# Patient Record
Sex: MALE | Race: WHITE | NOT HISPANIC OR LATINO | Employment: OTHER | URBAN - METROPOLITAN AREA
[De-identification: names, ages, dates, MRNs, and addresses within clinical notes are randomized per-mention and may not be internally consistent; named-entity substitution may affect disease eponyms.]

---

## 2017-09-08 ENCOUNTER — TRANSCRIBE ORDERS (OUTPATIENT)
Dept: SLEEP CENTER | Facility: CLINIC | Age: 57
End: 2017-09-08

## 2017-09-08 DIAGNOSIS — G47.33 OSA (OBSTRUCTIVE SLEEP APNEA): Primary | ICD-10-CM

## 2017-10-16 ENCOUNTER — HOSPITAL ENCOUNTER (OUTPATIENT)
Dept: SLEEP CENTER | Facility: CLINIC | Age: 57
Discharge: HOME | End: 2017-10-16
Payer: COMMERCIAL

## 2017-10-16 ENCOUNTER — TRANSCRIBE ORDERS (OUTPATIENT)
Dept: SLEEP CENTER | Facility: CLINIC | Age: 57
End: 2017-10-16

## 2017-10-16 DIAGNOSIS — G47.33 OSA (OBSTRUCTIVE SLEEP APNEA): Primary | ICD-10-CM

## 2017-10-16 DIAGNOSIS — G47.33 OSA (OBSTRUCTIVE SLEEP APNEA): ICD-10-CM

## 2017-10-16 NOTE — PROGRESS NOTES
Consultation - [address]  60 y o  male  :1960  SHELLEY:[id number]    Physician Requesting Consult: Self, Referral     Reason for Consult : I saw this patient for initial sleep evaluation today  He was diagnosed with obstructive sleep apnea in  but remains untreated  He returns because of escalation of his symptoms  Results of prior studies were not available at the time of this encounter  However he states he has apnea was in the mild to moderate category  He was prescribed CPAP but discontinued use because of mask intolerance and felt claustrophobic  Medications, Past, Family and Social Histories were reviewed  Comorbidities are notable for:  Depression with a history of a previous suicidal attempt  He failed multiple medications  He was started on mirtazapine in July of this year but symptoms persist   He also has mild COPD  Herewith findings in summary  Full details are available from our records  HPI:  He sleeps alone but states others have complained about his snoring that has been ongoing for many years  At times he awakens himself with a startle but is not aware of breathing difficulties during sleep  He has restless leg symptoms but only occasionally though can delay sleep  He also reports frequent nightmares but no other features of parasomnia  Sleep Routine:  He has a somewhat irregular sleep-wake routine  He spends 9-1/2 hours or more in bed  He is able to fall asleep in less than 30 minutes  Sleep is interrupted 2-3 times a night  He awakens with the aid of an alarm and is not rested  He has excessive daytime drowsiness, that he feels has gotten worse since he was started on mirtazapine, rated himself at 13/24 on the [place] Sleepiness Scale and tends to doze off whenever sedentary  Habits:  He is a former smoker  , he stopped alcohol use , he has a prior history of drug abuse but stopped use around 9095, he uses limited caffeine    He tries to exercise regularly  ROS:  Weight has been stable  A 10 point review of systems was otherwise unremarkable  Physical Exam: Salient findings on exam, are a body mass index 19 7  Vitals are stable  Mental state :  He has a depressed mood and constricted affect  He noted no serious suicidal thoughts  Craniofacial anatomy reveals narrow facies  Neck Circumference is 35 cm  There are no abnormal neck masses  Nasal airway reveals partial airflow obstruction due to a deviated septum and narrow nares  Mucous membranes appeared normal  The oral airway is crowded  Base of tongue is at Mallampati class III  There is AP narrowing  The rest of his exam was unremarkable  Impression:   1  Obstructive sleep apnea  2  Sleep maintenance insomnia  3  Restless leg syndrome and he may also have periodic limb movements of sleep  4  Hypersomnolence partly due to the above  He has psychiatric condition and use of sedating medications are contributing   5  Claustrophobia  6  COPD  7  Mood disturbance    Plan:  1  With respect to above conditions, I counseled on pathophysiology, diagnosis, treatment options, risks and benefits; interrelationship and effects on symptoms and comorbidities; risks of no treatment; costs and insurance aspects  2  I advised avoiding sleeping supine, using alcohol or sedating medications close to bed time and on safe driving practices  3  I did some  Cognitive behavioral therapy, advised on Sleep Hygiene and behavioral techniques to manage Insomnia  Specifically keeping a regular sleep-wake routine, limiting his time in bed to 8-1/2 hours or less and on relaxation techniques  4  We will attempt to obtain results of his prior studies  5  Patient is willing to try Positive airway pressure therapy and will need formal desensitization  I provided a prescription and he met with the DME provider in this regard  6   He is under care of psychiatrists and medications are being adjusted    7  Follow-up will be scheduled in approximately 6 weeks to monitor progress and to adjust therapy  He may need repeat studies or at minimum a repeat titration      Sincerely,    Travis Tripathi MD  Board Certified Specialist

## 2017-10-18 ENCOUNTER — TRANSCRIBE ORDERS (OUTPATIENT)
Dept: SLEEP CENTER | Facility: CLINIC | Age: 57
End: 2017-10-18

## 2017-10-18 DIAGNOSIS — G47.33 OSA (OBSTRUCTIVE SLEEP APNEA): Primary | ICD-10-CM

## 2017-10-27 ENCOUNTER — HOSPITAL ENCOUNTER (OUTPATIENT)
Dept: SLEEP CENTER | Facility: CLINIC | Age: 57
Discharge: HOME | End: 2017-10-27
Payer: COMMERCIAL

## 2017-10-27 DIAGNOSIS — G47.33 OSA (OBSTRUCTIVE SLEEP APNEA): ICD-10-CM

## 2017-10-27 PROCEDURE — 95810 POLYSOM 6/> YRS 4/> PARAM: CPT

## 2017-11-03 ENCOUNTER — TRANSCRIBE ORDERS (OUTPATIENT)
Dept: SLEEP CENTER | Facility: CLINIC | Age: 57
End: 2017-11-03

## 2017-11-03 DIAGNOSIS — G47.33 OSA (OBSTRUCTIVE SLEEP APNEA): Primary | ICD-10-CM

## 2017-11-08 ENCOUNTER — TRANSCRIBE ORDERS (OUTPATIENT)
Dept: SLEEP CENTER | Facility: CLINIC | Age: 57
End: 2017-11-08

## 2017-11-08 DIAGNOSIS — G47.33 OSA (OBSTRUCTIVE SLEEP APNEA): Primary | ICD-10-CM

## 2017-11-10 ENCOUNTER — HOSPITAL ENCOUNTER (OUTPATIENT)
Dept: SLEEP CENTER | Facility: CLINIC | Age: 57
Discharge: HOME | End: 2017-11-10
Payer: COMMERCIAL

## 2017-11-10 ENCOUNTER — TRANSCRIBE ORDERS (OUTPATIENT)
Dept: SLEEP CENTER | Facility: CLINIC | Age: 57
End: 2017-11-10

## 2017-11-10 DIAGNOSIS — G47.33 OSA (OBSTRUCTIVE SLEEP APNEA): ICD-10-CM

## 2017-11-10 DIAGNOSIS — G47.33 OSA (OBSTRUCTIVE SLEEP APNEA): Primary | ICD-10-CM

## 2018-11-16 ENCOUNTER — OFFICE VISIT (OUTPATIENT)
Dept: SLEEP CENTER | Facility: CLINIC | Age: 58
End: 2018-11-16
Payer: COMMERCIAL

## 2018-11-16 VITALS
HEIGHT: 68 IN | BODY MASS INDEX: 20 KG/M2 | OXYGEN SATURATION: 94 % | HEART RATE: 71 BPM | SYSTOLIC BLOOD PRESSURE: 144 MMHG | WEIGHT: 132 LBS | DIASTOLIC BLOOD PRESSURE: 68 MMHG

## 2018-11-16 DIAGNOSIS — J45.20 MILD INTERMITTENT ASTHMA WITHOUT COMPLICATION: ICD-10-CM

## 2018-11-16 DIAGNOSIS — J31.0 CHRONIC RHINITIS: ICD-10-CM

## 2018-11-16 DIAGNOSIS — R53.83 OTHER FATIGUE: ICD-10-CM

## 2018-11-16 DIAGNOSIS — G47.33 OSA (OBSTRUCTIVE SLEEP APNEA): Primary | ICD-10-CM

## 2018-11-16 DIAGNOSIS — G47.61 PLMD (PERIODIC LIMB MOVEMENT DISORDER): ICD-10-CM

## 2018-11-16 DIAGNOSIS — G47.9 SLEEP DISTURBANCE: ICD-10-CM

## 2018-11-16 DIAGNOSIS — R45.86 MOOD DISTURBANCE: ICD-10-CM

## 2018-11-16 DIAGNOSIS — K21.9 GASTROESOPHAGEAL REFLUX DISEASE WITHOUT ESOPHAGITIS: ICD-10-CM

## 2018-11-16 PROCEDURE — 99214 OFFICE O/P EST MOD 30 MIN: CPT | Performed by: INTERNAL MEDICINE

## 2018-11-16 RX ORDER — FAMOTIDINE 40 MG/1
TABLET, FILM COATED ORAL
COMMUNITY
Start: 2017-02-26

## 2018-11-16 RX ORDER — ELECTROLYTES/DEXTROSE
1 SOLUTION, ORAL ORAL
COMMUNITY

## 2018-11-16 RX ORDER — LAMOTRIGINE 150 MG/1
150 TABLET ORAL DAILY
Refills: 3 | COMMUNITY
Start: 2018-10-25

## 2018-11-16 RX ORDER — GABAPENTIN 100 MG/1
100 CAPSULE ORAL 3 TIMES DAILY
Refills: 3 | COMMUNITY
Start: 2018-09-14

## 2018-11-16 RX ORDER — MIRTAZAPINE 7.5 MG/1
7.5 TABLET, FILM COATED ORAL
COMMUNITY

## 2018-11-16 RX ORDER — ALBUTEROL SULFATE 90 UG/1
2 AEROSOL, METERED RESPIRATORY (INHALATION) EVERY 6 HOURS PRN
COMMUNITY
Start: 2016-08-25

## 2018-11-16 RX ORDER — FEXOFENADINE HCL 180 MG/1
1 TABLET ORAL
COMMUNITY

## 2018-11-16 NOTE — PROGRESS NOTES
Follow-Up Note - Sleep Santiago Lucero  59 y o  male  :1960  [facility]:[id number]    CC: I saw this patient for follow-up in clinic today for his Sleep Disordered Breathing, Coexisting Sleep and Medical Problems  He had recent respiratory infection and was unable to use CPAP  PFSH, Problem List, Medications & Allergies were reviewed in EMR  Interval changes: none reported  He  has a past medical history of Claustrophobia; COPD (chronic obstructive pulmonary disease) ([address] ); Depression; Insomnia; Laryngopharyngeal reflux (LPR); and JERICA (obstructive sleep apnea)  He has a current medication list which includes the following prescription(s): lactobacillus, famotidine, gabapentin, lamotrigine, mirtazapine, multivitamin adult, omega-3 fatty acids, albuterol, and fexofenadine  ROS: Reviewed (see attached)  He feels nasal allergies and asthma are controlled  He is still in the process of getting medications adjusted for his mood disorder  SUBJECTIVE: Parris Calderon reports re-initiating use around a week ago minor difficulty tolerating PAP; With respect to compliance in the past 6 days, data download shows:  using PAP > 4 hours/night 67% of the time  HALLEY (estimated) 1 6/hour at 90th percentile pressure of 4 7cm H2O     · He is experiencing some adverse effects: dry mouth and dry nose  · He is benefitting from use and reports: Unsure   · He reports less restless leg symptoms and is not aware of jerking movements during sleep  Sleep Routine: He reports getting 7-1/2 to 8 hr sleep  ; he has no difficulty initiating, but reports diffculty maintaining sleep  and he attributes it to CPAP  He feels sleep continuity was better when he was not using CPAP  He awakens with the aid of an alarm feeling unrefreshed  He has excessive drowsiness and may doze off when sedentary  Ehsan Simple He rated himself at 7 /24 on the [place] sleepiness scale  Habits: reports that he quit smoking about 11 years ago   He has never used smokeless tobacco ,  reports that he does not drink alcohol ,  reports that he does not use drugs  , Caffeine use: limited , Exercise routine: none but is physically active in his job  OBJECTIVE: /68   Pulse 71   Ht 5' 8" (1 727 m)   Wt 59 9 kg (132 lb)   SpO2 94%   BMI 20 07 kg/m²  Patient is well groomed; well appearing  Skin/Extrem: warm & dry; col & hydration normal; no edema  Psych: cooperativeand in no distress  Mental state appears anxious and somewhat depressed  CNS: Alert, orientated, clear & coherent speech  H&N: EOMI; NC/AT:no facial pressure marks, no rashes  [Neck Circumference: 30 (cm)   ENMT Mucus membranes normal Nasal airway:  Narrow nares Oral airway: crowded  Resp:effort is normal CVS: RRR ABD:truncal obesity MSK:Gait normal     ASSESSMENT: Primary Sleep/Secondary(to Medical or Psych conditions) & comorbidities   1  JERICA (obstructive sleep apnea)  Sleep F/U  - established patient    PAP DME Resupply/Reorder   2  PLMD (periodic limb movement disorder)     3  Sleep disturbance     4  Other fatigue     5  Mood disturbance     6  Chronic rhinitis     7  Mild intermittent asthma without complication     8  Gastroesophageal reflux disease without esophagitis         PLAN:  1  Treatment with  PAP is medically necessary and Parris Calderon is agreable to continue use  2  Care of equipment, methods to improve comfort using PAP and importance of compliance with therapy were discussed  3  Pressure settin-6 H2O     4  Rx provided to replace supplies and Care coordinated with DME provider  5  I advised ensuring adequate treatment of his allergies, asthma and acid reflux  6  He is under care for his psychiatric problems  7  Follow-up is advised in 1 year or sooner if needed to monitor progress, compliance and to adjust therapy  Thank you for allowing me to participate in the care of this patient      Sincerely,    Authenticated electronically by Trudi Reyes MD on 18   Board Certified Specialist

## 2021-03-27 ENCOUNTER — IMMUNIZATIONS (OUTPATIENT)
Dept: FAMILY MEDICINE CLINIC | Facility: HOSPITAL | Age: 61
End: 2021-03-27

## 2021-03-27 DIAGNOSIS — Z23 ENCOUNTER FOR IMMUNIZATION: Primary | ICD-10-CM

## 2021-03-27 PROCEDURE — 91300 SARS-COV-2 / COVID-19 MRNA VACCINE (PFIZER-BIONTECH) 30 MCG: CPT

## 2021-03-27 PROCEDURE — 0001A SARS-COV-2 / COVID-19 MRNA VACCINE (PFIZER-BIONTECH) 30 MCG: CPT

## 2021-04-17 ENCOUNTER — IMMUNIZATIONS (OUTPATIENT)
Dept: FAMILY MEDICINE CLINIC | Facility: HOSPITAL | Age: 61
End: 2021-04-17

## 2021-04-17 DIAGNOSIS — Z23 ENCOUNTER FOR IMMUNIZATION: Primary | ICD-10-CM

## 2021-04-17 PROCEDURE — 91300 SARS-COV-2 / COVID-19 MRNA VACCINE (PFIZER-BIONTECH) 30 MCG: CPT

## 2021-04-17 PROCEDURE — 0002A SARS-COV-2 / COVID-19 MRNA VACCINE (PFIZER-BIONTECH) 30 MCG: CPT
